# Patient Record
Sex: MALE | Race: WHITE | NOT HISPANIC OR LATINO | Employment: FULL TIME | URBAN - METROPOLITAN AREA
[De-identification: names, ages, dates, MRNs, and addresses within clinical notes are randomized per-mention and may not be internally consistent; named-entity substitution may affect disease eponyms.]

---

## 2018-11-19 ENCOUNTER — EVALUATION (OUTPATIENT)
Dept: PHYSICAL THERAPY | Facility: CLINIC | Age: 58
End: 2018-11-19
Payer: COMMERCIAL

## 2018-11-19 ENCOUNTER — TRANSCRIBE ORDERS (OUTPATIENT)
Dept: PHYSICAL THERAPY | Facility: CLINIC | Age: 58
End: 2018-11-19

## 2018-11-19 DIAGNOSIS — M25.511 ACUTE PAIN OF RIGHT SHOULDER: Primary | ICD-10-CM

## 2018-11-19 PROCEDURE — G8991 OTHER PT/OT GOAL STATUS: HCPCS | Performed by: PHYSICAL THERAPIST

## 2018-11-19 PROCEDURE — 97140 MANUAL THERAPY 1/> REGIONS: CPT | Performed by: PHYSICAL THERAPIST

## 2018-11-19 PROCEDURE — G8990 OTHER PT/OT CURRENT STATUS: HCPCS | Performed by: PHYSICAL THERAPIST

## 2018-11-19 PROCEDURE — 97161 PT EVAL LOW COMPLEX 20 MIN: CPT | Performed by: PHYSICAL THERAPIST

## 2018-11-19 NOTE — LETTER
2018    Javi Black MD  [facility]    Patient: Nanci Rico   YOB: 1960   Date of Visit: 2018     Encounter Diagnosis     ICD-10-CM    1  Acute pain of right shoulder M25 511        Dear Dr Pérez Knows:    Please review the attached Plan of Care from [facility] recent visit  Please verify that you agree therapy should continue by signing the attached document and sending it back to our office  If you have any questions or concerns, please don't hesitate to call  Sincerely,    Alcides Lujan, PT      Referring Provider:      I certify that I have read the below Plan of Care and certify the need for these services furnished under this plan of treatment while under my care  Javi Black MD  [address] [place] [address]: [phone]          PT Evaluation     Today's date: 2018  Patient name: Nanci Rico  : 1960  MRN: [id number]  Referring provider: Rashmi Wilcox MD  Dx:   Encounter Diagnosis     ICD-10-CM    1  Acute pain of right shoulder M25 511                   Assessment    Assessment details: Patient is a 62 y o  male who presents to physical therapy with physician diagnosis of Acute pain of right shoulder  (primary encounter diagnosis)  Patient would benefit from skilled physical therapy intervention to address his impairments and to maximize function  Thank you for your referral and please feel free to contact me at [phone]      Understanding of Dx/Px/POC: good   Prognosis: good    Goals  STG  Decreased pain by 50% in 2 weeks  Improve upper thoracic joint mobility by 50% in  Weeks  Orozco free A/PROM in 2 weeks    LTG  Return to work without restrictions in 4 weeks  Patient will sleep without pain in 4 weeks    Plan  Patient would benefit from: skilled physical therapy  Frequency: 3x week  Duration in weeks: 4  Treatment plan discussed with: patient        Subjective Evaluation    History of Present Illness  Mechanism of injury: Patient began having right shoulder pain in mid 2018 and consulted his PCP  He had a cortisone injection which did not provide relief  He then consulted ortho and MRI was prescribed  No RTC per patient  He then was prescribed prednisone which provided 70% relief  11/15 he had an injection which further alleviated his pian  Currently he has pain with weight bearing, overhead activities, throwing and heavy lifting  Denies pain with sleeping at night  Denies weakness and no longer has radiating pain down his upper arm  He initially had pain that did radiate down into his hand  Occupation -    [employer] since 18  Quality of life: good    Pain  Current pain ratin  At best pain rating: 3  At worst pain ratin  Location: right shoulder  Quality: sharp and dull ache  Relieving factors: ice and medications  Progression: improved    Treatments  Previous treatment: medication and injection treatment  Patient Goals  Patient goals for therapy: decreased pain, increased motion, increased strength, independence with ADLs/IADLs and return to work          Objective     General Comments     Shoulder Comments   Cervical AROM - rotation 50% b/l with severe tightness  (-) Spurlings test  Shoulder PROM - pain at end range horz add  Shoulder AROM WNL pain at end range elevation and horz add  MMT shoulder flexion 5/5, ER 4/5 with pain, IR 4+/5  Limited right 1st rib right rotation and depression    Limited T1/2 right rotation  Tenderness right subscapularis      Precautions: none    Daily Treatment Diary       Manuals                          STM to subscapularis  T1/2 right rotation mobs   Right 1st rib mobs 25                                      Exercise Diary Modalities

## 2018-11-19 NOTE — PROGRESS NOTES
PT Evaluation     Today's date: 2018  Patient name: Shruthi Sandoval  : 1960  MRN: [id number]  Referring provider: Jovani Simental MD  Dx:   Encounter Diagnosis     ICD-10-CM    1  Acute pain of right shoulder M25 511                   Assessment    Assessment details: Patient is a 62 y o  male who presents to physical therapy with physician diagnosis of Acute pain of right shoulder  (primary encounter diagnosis)  Patient would benefit from skilled physical therapy intervention to address his impairments and to maximize function  Thank you for your referral and please feel free to contact me at [phone]  Understanding of Dx/Px/POC: good   Prognosis: good    Goals  STG  Decreased pain by 50% in 2 weeks  Improve upper thoracic joint mobility by 50% in  Weeks  Orozco free A/PROM in 2 weeks    LTG  Return to work without restrictions in 4 weeks  Patient will sleep without pain in 4 weeks    Plan  Patient would benefit from: skilled physical therapy  Frequency: 3x week  Duration in weeks: 4  Treatment plan discussed with: patient        Subjective Evaluation    History of Present Illness  Mechanism of injury: Patient began having right shoulder pain in mid 2018 and consulted his PCP  He had a cortisone injection which did not provide relief  He then consulted ortho and MRI was prescribed  No RTC per patient  He then was prescribed prednisone which provided 70% relief  11/15 he had an injection which further alleviated his pian  Currently he has pain with weight bearing, overhead activities, throwing and heavy lifting  Denies pain with sleeping at night  Denies weakness and no longer has radiating pain down his upper arm  He initially had pain that did radiate down into his hand     Occupation -    [employer] since 18  Quality of life: good    Pain  Current pain ratin  At best pain rating: 3  At worst pain ratin  Location: right shoulder  Quality: sharp and dull ache  Relieving factors: ice and medications  Progression: improved    Treatments  Previous treatment: medication and injection treatment  Patient Goals  Patient goals for therapy: decreased pain, increased motion, increased strength, independence with ADLs/IADLs and return to work          Objective     General Comments     Shoulder Comments   Cervical AROM - rotation 50% b/l with severe tightness  (-) Spurlings test  Shoulder PROM - pain at end range horz add  Shoulder AROM WNL pain at end range elevation and horz add  MMT shoulder flexion 5/5, ER 4/5 with pain, IR 4+/5  Limited right 1st rib right rotation and depression    Limited T1/2 right rotation  Tenderness right subscapularis      Precautions: none    Daily Treatment Diary       Manuals 11/19                         STM to subscapularis  T1/2 right rotation mobs   Right 1st rib mobs 25                                      Exercise Diary                                                                                                                                                                                                                                                                                                            Modalities

## 2018-11-23 ENCOUNTER — OFFICE VISIT (OUTPATIENT)
Dept: PHYSICAL THERAPY | Facility: CLINIC | Age: 58
End: 2018-11-23
Payer: COMMERCIAL

## 2018-11-23 DIAGNOSIS — M25.511 ACUTE PAIN OF RIGHT SHOULDER: Primary | ICD-10-CM

## 2018-11-23 PROCEDURE — 97110 THERAPEUTIC EXERCISES: CPT | Performed by: PHYSICAL THERAPIST

## 2018-11-23 PROCEDURE — 97140 MANUAL THERAPY 1/> REGIONS: CPT | Performed by: PHYSICAL THERAPIST

## 2018-11-24 NOTE — PROGRESS NOTES
Daily Note     Today's date: 2018  Patient name: Ginny Eldridge  : 1960  MRN: [id number]  Referring provider: Silverio Cooper MD  Dx:   Encounter Diagnosis     ICD-10-CM    1  Acute pain of right shoulder M25 511                   Subjective: Responded well to initial treatment with decreased pain noted       Objective: See treatment diary below      Assessment: Tolerated treatment well  Patient would benefit from continued PT      Plan: Progress treatment as tolerated  General Comments     Shoulder Comments   Cervical AROM - rotation 50% b/l with severe tightness  (-) Spurlings test  Shoulder PROM - pain at end range horz add  Shoulder AROM WNL pain at end range elevation and horz add  MMT shoulder flexion 5/5, ER 4/5 with pain, IR 4+/5  Limited right 1st rib right rotation and depression    Limited T1/2 right rotation  Tenderness right subscapularis      Precautions: none    Daily Treatment Diary       Manuals                         STM to subscapularis  T1/2 right rotation mobs   Right 1st rib mobs 25 25                                     Exercise Diary                           Upper thoracic extension stretch over pillow f/b LTR  10'                                                                                                                                                                                                                                                                               Modalities             P  10

## 2018-11-27 ENCOUNTER — OFFICE VISIT (OUTPATIENT)
Dept: PHYSICAL THERAPY | Facility: CLINIC | Age: 58
End: 2018-11-27
Payer: COMMERCIAL

## 2018-11-27 DIAGNOSIS — M25.511 ACUTE PAIN OF RIGHT SHOULDER: Primary | ICD-10-CM

## 2018-11-27 PROCEDURE — 97110 THERAPEUTIC EXERCISES: CPT

## 2018-11-27 PROCEDURE — 97140 MANUAL THERAPY 1/> REGIONS: CPT

## 2018-11-27 NOTE — PROGRESS NOTES
Daily Note     Today's date: 2018  Patient name: Ginny Eldridge  : 1960  MRN: [id number]  Referring provider: Silverio Cooper MD  Dx:   Encounter Diagnosis     ICD-10-CM    1  Acute pain of right shoulder M25 511                   Subjective:  Pt reports intermittent pain right shoulder to elbow  Objective: See treatment diary below      Assessment:  Good tolerance to progression of TE and manual therapy  Patient would benefit from continued PT      Plan: Progress treatment as tolerated  General Comments     Shoulder Comments   Cervical AROM - rotation 50% b/l with severe tightness  (-) Spurlings test  Shoulder PROM - pain at end range horz add  Shoulder AROM WNL pain at end range elevation and horz add  MMT shoulder flexion 5/5, ER 4/5 with pain, IR 4+/5  Limited right 1st rib right rotation and depression    Limited T1/2 right rotation  Tenderness right subscapularis      Precautions: none    Daily Treatment Diary       Manuals                        STM to subscapularis  T1/2 right rotation mobs   Right 1st rib mobs 25 25 20' mobs performed by PT (ND)                                    Exercise Diary                           Upper thoracic extension stretch over pillow f/b LTR  10' 10'          Foam roller series   3 min stretch f/b 10x ea          tband b/l ER and lower trap    Red 5"x10 ea          Seated at wall 1st rib self mobs with towel    x10 ea                                                                                                                                                                                                                                       Modalities             MHP to right shoulder supine  10 10'

## 2018-11-29 ENCOUNTER — OFFICE VISIT (OUTPATIENT)
Dept: PHYSICAL THERAPY | Facility: CLINIC | Age: 58
End: 2018-11-29
Payer: COMMERCIAL

## 2018-11-29 DIAGNOSIS — M25.511 ACUTE PAIN OF RIGHT SHOULDER: Primary | ICD-10-CM

## 2018-11-29 PROCEDURE — 97110 THERAPEUTIC EXERCISES: CPT

## 2018-11-29 PROCEDURE — 97140 MANUAL THERAPY 1/> REGIONS: CPT

## 2018-11-29 NOTE — PROGRESS NOTES
Daily Note     Today's date: 2018  Patient name: Shruthi Sandoval  : 1960  MRN: [id number]  Referring provider: Jovani Simental MD  Dx:   Encounter Diagnosis     ICD-10-CM    1  Acute pain of right shoulder M25 511                   Subjective:  Cont'd c/o right shoulder pain with certain motion but overall slowly improving  Objective: See treatment diary below      Assessment:  Good tolerance to progression of TE and manual therapy  Minimal soreness/muscle fatigue reported post treatment  Patient would benefit from continued PT      Plan: Progress treatment as tolerated  General Comments     Shoulder Comments   Cervical AROM - rotation 50% b/l with severe tightness  (-) Spurlings test  Shoulder PROM - pain at end range horz add  Shoulder AROM WNL pain at end range elevation and horz add  MMT shoulder flexion 5/5, ER 4/5 with pain, IR 4+/5  Limited right 1st rib right rotation and depression    Limited T1/2 right rotation  Tenderness right subscapularis      Precautions: none    Daily Treatment Diary       Manuals                       STM to subscapularis  T1/2 right rotation mobs   Right 1st rib mobs 25 25 20' mobs performed by PT (ND) 15' performed by PT                                   Exercise Diary                          Upper thoracic extension stretch over pillow f/b LTR  10' 10' 10'         Foam roller series   3 min stretch f/b 10x ea 3 min stretch f/b 10x ea         tband b/l ER and lower trap    Red 5"x10 ea Green10"x10 ea         Seated at wall 1st rib self mobs with towel    x10 ea x10 ea flex, abd         Seated at wall right scalene stretch with towel    10"x10                                                                                                                                                                                                                         Modalities            MHP to right shoulder supine 10 10' 10'

## 2018-12-04 ENCOUNTER — OFFICE VISIT (OUTPATIENT)
Dept: PHYSICAL THERAPY | Facility: CLINIC | Age: 58
End: 2018-12-04
Payer: COMMERCIAL

## 2018-12-04 DIAGNOSIS — M25.511 ACUTE PAIN OF RIGHT SHOULDER: Primary | ICD-10-CM

## 2018-12-04 PROCEDURE — 97110 THERAPEUTIC EXERCISES: CPT

## 2018-12-04 PROCEDURE — 97140 MANUAL THERAPY 1/> REGIONS: CPT

## 2018-12-04 NOTE — PROGRESS NOTES
Daily Note     Today's date: 2018  Patient name: Rocio Camacho  : 1960  MRN: [id number]  Referring provider: Ruslan Martinez MD  Dx:   Encounter Diagnosis     ICD-10-CM    1  Acute pain of right shoulder M25 511                   Subjective:  Pt reports decrease right shoulder pain and improved tolerance to working overhead  Pt states 75% improvement overall since starting therapy  Objective: See treatment diary below  Pt seen x5 visits with FOTO outcome measure improving from 57 to 79  Assessment:  Good tolerance to progression of TE  Pt cont's to respond well to treatment with improved cerv rotation AROM and pain free right shoulder mobility noted  Pt demonstrates proper technique with ex requirng minimal verbal cues  Patient would benefit from continued PT      Plan: Progress treatment as tolerated  General Comments     Shoulder Comments   Cervical AROM - rotation 50% b/l with severe tightness  (-) Spurlings test  Shoulder PROM - pain at end range horz add  Shoulder AROM WNL pain at end range elevation and horz add  MMT shoulder flexion 5/5, ER 4/5 with pain, IR 4+/5  Limited right 1st rib right rotation and depression    Limited T1/2 right rotation  Tenderness right subscapularis      Precautions: none    Daily Treatment Diary       Manuals  12/4                     STM to subscapularis  T1/2 right rotation mobs   Right 1st rib mobs  Inferior capsule mobs 25 25 20' mobs performed by PT (ND) 15' performed by PT 10' performed by PT (ND)                                   Exercise Diary     12/4                     Upper thoracic extension stretch over pillow f/b LTR  10' 10' 10' 10'        Foam roller series   3 min stretch f/b 10x ea 3 min stretch f/b 10x ea 3 min stretch f/b 10x ea        tband b/l ER and lower trap    Red 5"x10 ea Green10"x10 ea green 10"x  10 ea        Seated at wall 1st rib self mobs with towel    x10 ea x10 ea flex, abd x10 each        Seated at wall right scalene stretch with towel    10"x10 10"x  10        Pivot prone      10"x  10                                                                                                                                                                                                           Modalities   11/27 11/29 12/4        MHP to right shoulder supine  10 10' 10' 10'

## 2018-12-06 ENCOUNTER — OFFICE VISIT (OUTPATIENT)
Dept: PHYSICAL THERAPY | Facility: CLINIC | Age: 58
End: 2018-12-06
Payer: COMMERCIAL

## 2018-12-06 DIAGNOSIS — M25.511 ACUTE PAIN OF RIGHT SHOULDER: Primary | ICD-10-CM

## 2018-12-06 PROCEDURE — 97140 MANUAL THERAPY 1/> REGIONS: CPT | Performed by: PHYSICAL THERAPIST

## 2018-12-06 PROCEDURE — 97110 THERAPEUTIC EXERCISES: CPT | Performed by: PHYSICAL THERAPIST

## 2018-12-06 NOTE — PROGRESS NOTES
Daily Note     Today's date: 2018  Patient name: Davida Garcia  : 1960  MRN: [id number]  Referring provider: Sarika Holden MD  Dx:   Encounter Diagnosis     ICD-10-CM    1  Acute pain of right shoulder M25 511                   Subjective:  Pt reports some increased soreness in right shoulder following previous treatment  Objective: See treatment diary below  Assessment:  Good tolerance to MT and TE  Continued restriction at T1/2 noted with manuals  Pt demonstrates proper technique with ex requirng minimal verbal cues  Patient would benefit from continued PT  Plan: Progress treatment as tolerated  General Comments     Shoulder Comments   Cervical AROM - rotation 50% b/l with severe tightness  (-) Spurlings test  Shoulder PROM - pain at end range horz add  Shoulder AROM WNL pain at end range elevation and horz add  MMT shoulder flexion 5/5, ER 4/5 with pain, IR 4+/5  Limited right 1st rib right rotation and depression    Limited T1/2 right rotation  Tenderness right subscapularis      Precautions: none    Daily Treatment Diary       Manuals  12/6                    STM to subscapularis  T1/2 right rotation mobs   Right 1st rib mobs  Inferior capsule mobs 25 25 20' mobs performed by PT (ND) 15' performed by PT 10' performed by PT (ND)  10'                                  Exercise Diary     12/6                    Upper thoracic extension stretch over pillow f/b LTR  10' 10' 10' 10' 5'        Foam roller series   3 min stretch f/b 10x ea 3 min stretch f/b 10x ea 3 min stretch f/b 10x ea 3 min stretch f/b 10x ea       tband b/l ER and lower trap    Red 5"x10 ea Green10"x10 ea green 10"x  10 ea green 10"x  10 ea       Seated at wall 1st rib self mobs with towel    x10 ea x10 ea flex, abd x10 each x10 each       Seated at wall right scalene stretch with towel    10"x10 10"x  10 :10x10       Pivot prone      10"x  10 :10x10 Modalities   11/27 11/29 12/4 12/6       MHP to right shoulder supine  10 10' 10' 10' 10'

## 2018-12-11 ENCOUNTER — OFFICE VISIT (OUTPATIENT)
Dept: PHYSICAL THERAPY | Facility: CLINIC | Age: 58
End: 2018-12-11
Payer: COMMERCIAL

## 2018-12-11 DIAGNOSIS — M25.511 ACUTE PAIN OF RIGHT SHOULDER: Primary | ICD-10-CM

## 2018-12-11 PROCEDURE — 97110 THERAPEUTIC EXERCISES: CPT

## 2018-12-11 PROCEDURE — 97140 MANUAL THERAPY 1/> REGIONS: CPT

## 2018-12-11 NOTE — PROGRESS NOTES
Daily Note     Today's date: 2018  Patient name: Marko Watts  : 1960  MRN: [id number]  Referring provider: Zarina Quan MD  Dx:   Encounter Diagnosis     ICD-10-CM    1  Acute pain of right shoulder M25 511                   Subjective:  Pt reports cont'd improvement overall with decrease right shoulder pain  Pt reports some soreness the day after therapy  Objective: See treatment diary below  Assessment:  Pt cont's to respond well to TE and manual therapy  Minimal muscle soreness reported with TE  Pt demonstrates proper technique with ex requirng minimal verbal cues  Patient would benefit from continued PT  Plan: Progress treatment as tolerated  General Comments     Shoulder Comments   Cervical AROM - rotation 50% b/l with severe tightness  (-) Spurlings test  Shoulder PROM - pain at end range horz add  Shoulder AROM WNL pain at end range elevation and horz add  MMT shoulder flexion 5/5, ER 4/5 with pain, IR 4+/5  Limited right 1st rib right rotation and depression    Limited T1/2 right rotation  Tenderness right subscapularis      Precautions: none    Daily Treatment Diary       Manuals                    STM to subscapularis  T1/2 right rotation mobs   Right 1st rib mobs  Inferior capsule mobs 25 25 20' mobs performed by PT (ND) 15' performed by PT 10' performed by PT (ND)  10'  10'                                Exercise Diary                       Upper thoracic extension stretch over pillow f/b LTR  10' 10' 10' 10' 5'  5'      Foam roller series   3 min stretch f/b 10x ea 3 min stretch f/b 10x ea 3 min stretch f/b 10x ea 3 min stretch f/b 10x ea 3 min stretch f/b 10x ea      tband b/l ER and lower trap    Red 5"x10 ea Green10"x10 ea green 10"x  10 ea green 10"x  10 ea Green 10"x10      Seated at wall 1st rib self mobs with towel    x10 ea x10 ea flex, abd x10 each x10 each x10 ea      Seated at wall right scalene stretch with towel    10"x10 10"x  10 :10x10 10"x 10      Pivot prone      10"x  10 :10x10 10"x 10                                                                                                                                                                                                         Modalities   11/27 11/29 12/4 12/6 12/11      MHP to right shoulder supine  10 10' 10' 10' 10' 10'

## 2018-12-13 ENCOUNTER — OFFICE VISIT (OUTPATIENT)
Dept: PHYSICAL THERAPY | Facility: CLINIC | Age: 58
End: 2018-12-13
Payer: COMMERCIAL

## 2018-12-13 DIAGNOSIS — M25.511 ACUTE PAIN OF RIGHT SHOULDER: Primary | ICD-10-CM

## 2018-12-13 PROCEDURE — 97110 THERAPEUTIC EXERCISES: CPT

## 2018-12-13 PROCEDURE — 97140 MANUAL THERAPY 1/> REGIONS: CPT

## 2018-12-13 NOTE — PROGRESS NOTES
Daily Note / Discharge Summary     Today's date: 2018  Patient name: Johnathan Coburn  : 1960  MRN: [id number]  Referring provider: Lakesha Mcmahon MD  Dx:   Encounter Diagnosis     ICD-10-CM    1  Acute pain of right shoulder M25 511                   Subjective:  Pt reports 90% improvement since starting therapy  Objective: See treatment diary below  Issued pt HEP  Assessment:  Good tolerance to TE  Pt has good understanding of ex program and demonstrates proper technique  Plan: Discussed with therapist (ND) and will D/C PT to HEP at this time            Precautions: none    Daily Treatment Diary       Manuals                   STM to subscapularis  T1/2 right rotation mobs   Right 1st rib mobs  Inferior capsule mobs 25 25 20' mobs performed by PT (ND) 15' performed by PT 10' performed by PT (ND)  10'  10' 10' perforemd by PT (ND)                                Exercise Diary                      Upper thoracic extension stretch over pillow f/b LTR  10' 10' 10' 10' 5'  5' 5'     Foam roller series   3 min stretch f/b 10x ea 3 min stretch f/b 10x ea 3 min stretch f/b 10x ea 3 min stretch f/b 10x ea 3 min stretch f/b 10x ea 3 min stretch f/b 10x ea     tband b/l ER and lower trap    Red 5"x10 ea Green10"x10 ea green 10"x  10 ea green 10"x  10 ea Green 10"x10 Green 10"x 10 ea     Seated at wall 1st rib self mobs with towel    x10 ea x10 ea flex, abd x10 each x10 each x10 ea x10 ea     Seated at wall right scalene stretch with towel    10"x10 10"x  10 :10x10 10"x 10 10"x 10     Pivot prone      10"x  10 :10x10 10"x 10 10"x 10     Inferior  capsule stretch         30"x5                                                                                                                                                                                           Modalities        MHP to right shoulder supine  10 10' 10' 10' 10' 10' 10'

## 2018-12-18 ENCOUNTER — APPOINTMENT (OUTPATIENT)
Dept: PHYSICAL THERAPY | Facility: CLINIC | Age: 58
End: 2018-12-18
Payer: COMMERCIAL

## 2018-12-20 ENCOUNTER — APPOINTMENT (OUTPATIENT)
Dept: PHYSICAL THERAPY | Facility: CLINIC | Age: 58
End: 2018-12-20
Payer: COMMERCIAL

## 2021-04-08 DIAGNOSIS — Z23 ENCOUNTER FOR IMMUNIZATION: ICD-10-CM
